# Patient Record
(demographics unavailable — no encounter records)

---

## 2025-03-06 NOTE — HISTORY OF PRESENT ILLNESS
[FreeTextEntry1] : 67 yo M, former smoker, with HTN, HLD, DM2, prior TIA, and obesity who is s/p PCI of RCA with MOON x 2 for moderate risk stress test results consistent with silent ischemia of > 10% of myocardium. Uneventful post-PCI course with EDUARDO III flow in culprit vessel.   03/06/25 - Patient continues to do well post-PCI. Diabetic management deferred to PCP, but CDL paperwork re blood pressure (optimal) and A1c filled out for patient's convenience. Maintains strong adherence with DAPT. No chest pain, shortness of breath, palpitations, lower extremity swelling, orthopnea, PND. Patient has been otherwise doing well and maintaining good exercise capacity without any noticeable decline. A ten-point review of systems was performed and found to be negative, except per HPI. No significant change since last visit in 2024 (stable > 1 year).   CARDIOVASCULAR EXAM: Vital Signs Reviewed (see below) Gen: NAD Cardiac: RRR, nl s1, s2. Normal PMI. No murmurs, rubs, or gallops. Lungs:  Good air flow. No wheezing or stridor. Ext: Warm. 2+ radial pulses bilaterally. No lower extremity edema.  CARDIOVASCULAR STUDIES REVIEWED: 03/052025 Nuclear Stress Test: Minor zenobia-infarct ischemia in inferior wall territory, much improved since nuclear study in 2023. Low-risk.  12/09/2024 LAB: Cr 1.03, A1c 6.7, TChol 104, HDL 49, LDL 34

## 2025-03-06 NOTE — CARDIOLOGY SUMMARY
[de-identified] : 06/26/2024: NSR, normal ECG.  02/21/2024: NSR, HR 83, no ischemic changes. [de-identified] : Exercise nuclear SPECT 6/30/2023 ([facility]): 4:14min, 7.1 METS.  MPHR 94%. Ischemic EKG changes with T wave inversions in leads V4 to V6.  ST depressions 1 to 2 mm.  No chest pain.  Partially reversible defect of moderate severity in the basal to apical inferior and inferolateral walls with regional hypokinesis. [de-identified] : [facility] TTE 04/19/24: LVE 55-60% w/o WMA. False tendon (normal variant)   [de-identified] : CCTA 4/5/2024: CAC score = 719.  LM 4, LAD 86, LCx 96, .  [de-identified] : PCI 03/25/24: 70% prox-mid and 80% mid-distal RCA stenoses s/p IVUS-guided PCI with [place] MOON x 2 (2.75 18 mm and 2.5 x 38 mm post dil 3.5 mm proximally and 3.0 mm distally) with excellent angiographic result.  LCx with retrograde OM filling via R-->L and L-->L (bridging) collaterals. Mild luminal LAD disease. LVEDP 9 mmHg.